# Patient Record
Sex: MALE | URBAN - METROPOLITAN AREA
[De-identification: names, ages, dates, MRNs, and addresses within clinical notes are randomized per-mention and may not be internally consistent; named-entity substitution may affect disease eponyms.]

---

## 2020-03-10 ENCOUNTER — LAB REQUISITION (OUTPATIENT)
Dept: LAB | Age: 35
End: 2020-03-10

## 2020-03-10 DIAGNOSIS — N52.9 MALE ERECTILE DYSFUNCTION, UNSPECIFIED: ICD-10-CM

## 2020-03-10 PROCEDURE — PSEU9837 TESTOSTERONE, FREE AND TOTAL MALE BY ED: Performed by: CLINICAL MEDICAL LABORATORY

## 2020-03-10 PROCEDURE — 84403 ASSAY OF TOTAL TESTOSTERONE: CPT | Performed by: CLINICAL MEDICAL LABORATORY

## 2020-03-10 PROCEDURE — 84402 ASSAY OF FREE TESTOSTERONE: CPT | Performed by: CLINICAL MEDICAL LABORATORY

## 2020-03-15 LAB
TESTOST FREE SERPL-MCNC: 118.4 PG/ML
TESTOST SERPL-MCNC: 428 NG/DL

## 2020-07-28 ENCOUNTER — LAB REQUISITION (OUTPATIENT)
Dept: LAB | Age: 35
End: 2020-07-28

## 2020-07-28 LAB
ALBUMIN SERPL-MCNC: 4 G/DL (ref 3.6–5.1)
ALP SERPL-CCNC: 73 UNITS/L (ref 45–117)
ALT SERPL-CCNC: 26 UNITS/L
AST SERPL-CCNC: 13 UNITS/L
BILIRUB CONJ SERPL-MCNC: 0.2 MG/DL (ref 0–0.2)
BILIRUB SERPL-MCNC: 1.1 MG/DL (ref 0.2–1)
BUN SERPL-MCNC: 21 MG/DL (ref 6–20)
CALCIUM SERPL-MCNC: 9.6 MG/DL (ref 8.4–10.2)
CHLORIDE SERPL-SCNC: 104 MMOL/L (ref 98–107)
CHOLEST SERPL-MCNC: 184 MG/DL
CREAT SERPL-MCNC: 0.96 MG/DL (ref 0.67–1.17)
FASTING DURATION TIME PATIENT: 12 HOURS
GFR SERPLBLD BASED ON 1.73 SQ M-ARVRAT: >90 ML/MIN/1.73M2
GGT SERPL-CCNC: 28 UNITS/L (ref 15–85)
GLUCOSE SERPL-MCNC: 71 MG/DL (ref 65–99)
HCV AB SER QL: NEGATIVE
PHOSPHATE SERPL-MCNC: 3.7 MG/DL (ref 2.4–4.7)
POTASSIUM SERPL-SCNC: 4.2 MMOL/L (ref 3.4–5.1)
PROT SERPL-MCNC: 7.1 G/DL (ref 6.4–8.2)
SODIUM SERPL-SCNC: 138 MMOL/L (ref 135–145)
TRIGL SERPL-MCNC: 120 MG/DL
URATE SERPL-MCNC: 6.7 MG/DL (ref 3.5–7.2)

## 2020-07-28 PROCEDURE — PSEU8240 CALCIUM: Performed by: CLINICAL MEDICAL LABORATORY

## 2020-07-28 PROCEDURE — 84100 ASSAY OF PHOSPHORUS: CPT | Performed by: CLINICAL MEDICAL LABORATORY

## 2020-07-28 PROCEDURE — 84295 ASSAY OF SERUM SODIUM: CPT | Performed by: CLINICAL MEDICAL LABORATORY

## 2020-07-28 PROCEDURE — 82977 ASSAY OF GGT: CPT | Performed by: CLINICAL MEDICAL LABORATORY

## 2020-07-28 PROCEDURE — 82435 ASSAY OF BLOOD CHLORIDE: CPT | Performed by: CLINICAL MEDICAL LABORATORY

## 2020-07-28 PROCEDURE — PSEU8294 SODIUM: Performed by: CLINICAL MEDICAL LABORATORY

## 2020-07-28 PROCEDURE — PSEU8253 CREATININE: Performed by: CLINICAL MEDICAL LABORATORY

## 2020-07-28 PROCEDURE — 84520 ASSAY OF UREA NITROGEN: CPT | Performed by: CLINICAL MEDICAL LABORATORY

## 2020-07-28 PROCEDURE — PSEU8528 HEPATITIS C ANTIBODY WITH REFLEX: Performed by: CLINICAL MEDICAL LABORATORY

## 2020-07-28 PROCEDURE — PSEU8303 URIC ACID: Performed by: CLINICAL MEDICAL LABORATORY

## 2020-07-28 PROCEDURE — 82565 ASSAY OF CREATININE: CPT | Performed by: CLINICAL MEDICAL LABORATORY

## 2020-07-28 PROCEDURE — 84550 ASSAY OF BLOOD/URIC ACID: CPT | Performed by: CLINICAL MEDICAL LABORATORY

## 2020-07-28 PROCEDURE — PSEU8246 CHLORIDE: Performed by: CLINICAL MEDICAL LABORATORY

## 2020-07-28 PROCEDURE — PSEU9047 TRIGLYCERIDE: Performed by: CLINICAL MEDICAL LABORATORY

## 2020-07-28 PROCEDURE — 84478 ASSAY OF TRIGLYCERIDES: CPT | Performed by: CLINICAL MEDICAL LABORATORY

## 2020-07-28 PROCEDURE — PSEU8282 POTASSIUM: Performed by: CLINICAL MEDICAL LABORATORY

## 2020-07-28 PROCEDURE — 84132 ASSAY OF SERUM POTASSIUM: CPT | Performed by: CLINICAL MEDICAL LABORATORY

## 2020-07-28 PROCEDURE — PSEU8267 HEPATIC FUNCTION PANEL: Performed by: CLINICAL MEDICAL LABORATORY

## 2020-07-28 PROCEDURE — PSEU8263 GGT: Performed by: CLINICAL MEDICAL LABORATORY

## 2020-07-28 PROCEDURE — 86803 HEPATITIS C AB TEST: CPT | Performed by: CLINICAL MEDICAL LABORATORY

## 2020-07-28 PROCEDURE — 80076 HEPATIC FUNCTION PANEL: CPT | Performed by: CLINICAL MEDICAL LABORATORY

## 2020-07-28 PROCEDURE — PSEU8279 PHOSPHORUS: Performed by: CLINICAL MEDICAL LABORATORY

## 2020-07-28 PROCEDURE — 82947 ASSAY GLUCOSE BLOOD QUANT: CPT | Performed by: CLINICAL MEDICAL LABORATORY

## 2020-07-28 PROCEDURE — 82465 ASSAY BLD/SERUM CHOLESTEROL: CPT | Performed by: CLINICAL MEDICAL LABORATORY

## 2020-07-28 PROCEDURE — PSEU8247 CHOLESTEROL: Performed by: CLINICAL MEDICAL LABORATORY

## 2020-07-28 PROCEDURE — PSEU8239 BLOOD UREA NITROGEN: Performed by: CLINICAL MEDICAL LABORATORY

## 2020-07-28 PROCEDURE — PSEU8103 GLUCOSE LEVEL: Performed by: CLINICAL MEDICAL LABORATORY

## 2020-07-28 PROCEDURE — 82310 ASSAY OF CALCIUM: CPT | Performed by: CLINICAL MEDICAL LABORATORY

## 2020-08-27 ENCOUNTER — LAB REQUISITION (OUTPATIENT)
Dept: LAB | Age: 35
End: 2020-08-27

## 2020-08-27 PROCEDURE — 84443 ASSAY THYROID STIM HORMONE: CPT | Performed by: CLINICAL MEDICAL LABORATORY

## 2020-08-27 PROCEDURE — 80061 LIPID PANEL: CPT | Performed by: CLINICAL MEDICAL LABORATORY

## 2020-08-27 PROCEDURE — PSEU8135 LIPID PANEL WITH REFLEX: Performed by: CLINICAL MEDICAL LABORATORY

## 2020-08-27 PROCEDURE — PSEU8168 THYROID STIMULATING HORMONE REFLEX: Performed by: CLINICAL MEDICAL LABORATORY

## 2020-08-28 LAB
CHOLEST SERPL-MCNC: 187 MG/DL
CHOLEST/HDLC SERPL: 4.9 {RATIO}
FASTING DURATION TIME PATIENT: 12 HOURS
HDLC SERPL-MCNC: 38 MG/DL
LDLC SERPL CALC-MCNC: 132 MG/DL
NONHDLC SERPL-MCNC: 149 MG/DL
TRIGL SERPL-MCNC: 87 MG/DL
TSH SERPL-ACNC: 2.93 MCUNITS/ML (ref 0.35–5)

## 2022-09-09 ENCOUNTER — APPOINTMENT (OUTPATIENT)
Dept: GENERAL RADIOLOGY | Age: 37
End: 2022-09-09
Attending: STUDENT IN AN ORGANIZED HEALTH CARE EDUCATION/TRAINING PROGRAM
Payer: COMMERCIAL

## 2022-09-09 ENCOUNTER — HOSPITAL ENCOUNTER (EMERGENCY)
Age: 37
Discharge: HOME | End: 2022-09-09
Attending: STUDENT IN AN ORGANIZED HEALTH CARE EDUCATION/TRAINING PROGRAM
Payer: COMMERCIAL

## 2022-09-09 VITALS
OXYGEN SATURATION: 98 % | WEIGHT: 205 LBS | HEIGHT: 68 IN | DIASTOLIC BLOOD PRESSURE: 93 MMHG | BODY MASS INDEX: 31.07 KG/M2 | HEART RATE: 70 BPM | TEMPERATURE: 97.9 F | SYSTOLIC BLOOD PRESSURE: 136 MMHG | RESPIRATION RATE: 16 BRPM

## 2022-09-09 DIAGNOSIS — S83.422A SPRAIN OF LATERAL COLLATERAL LIGAMENT OF LEFT KNEE, INITIAL ENCOUNTER: Primary | ICD-10-CM

## 2022-09-09 PROCEDURE — 73562 X-RAY EXAM OF KNEE 3: CPT

## 2022-09-09 PROCEDURE — 99283 EMERGENCY DEPT VISIT LOW MDM: CPT

## 2022-09-09 RX ORDER — PHENOL/SODIUM PHENOLATE
20 AEROSOL, SPRAY (ML) MUCOUS MEMBRANE DAILY
COMMUNITY

## 2022-09-09 NOTE — ED TRIAGE NOTES
Pt arrives to ER ambulatory in no acute distress with left knee pain that started yesterday while at work.

## 2022-09-09 NOTE — ED PROVIDER NOTES
Patient is a 49-year-old male presenting today with left knee pain. Yesterday while working, states that he was walking around in a freezer when he twisted his knee and has since had pain. Pain is along the lateral aspect of the knee. No swelling noted. Pain improves with NSAIDs. Worse with movement and walking. No history of other trauma to this knee. No other injuries. Right now pain is moderate in severity. Social History     Socioeconomic History    Marital status: SINGLE     Spouse name: Not on file    Number of children: Not on file    Years of education: Not on file    Highest education level: Not on file   Occupational History    Not on file   Tobacco Use    Smoking status: Never    Smokeless tobacco: Never   Substance and Sexual Activity    Alcohol use: Not Currently    Drug use: Never    Sexual activity: Not on file   Other Topics Concern    Not on file   Social History Narrative    Not on file     Social Determinants of Health     Financial Resource Strain: Not on file   Food Insecurity: Not on file   Transportation Needs: Not on file   Physical Activity: Not on file   Stress: Not on file   Social Connections: Not on file   Intimate Partner Violence: Not on file   Housing Stability: Not on file         ALLERGIES: Patient has no known allergies. Review of Systems   Musculoskeletal:  Positive for arthralgias. All other systems reviewed and are negative. Vitals:    09/09/22 1222   BP: (!) 136/93   Pulse: 70   Resp: 16   Temp: 97.9 °F (36.6 °C)   SpO2: 98%   Weight: 93 kg (205 lb)   Height: 5' 8\" (1.727 m)            Physical Exam  Constitutional:       General: He is not in acute distress. Appearance: He is well-developed. He is not ill-appearing. HENT:      Head: Normocephalic. Eyes:      Conjunctiva/sclera: Conjunctivae normal.   Pulmonary:      Effort: Pulmonary effort is normal. No respiratory distress. Musculoskeletal:         General: Normal range of motion. Cervical back: Normal range of motion. Comments: Left lower extremity: Tenderness to the lateral aspect of the knee and pain with varus strain on the knee. No ligamentous laxity. No effusion noted. Skin:     General: Skin is warm and dry. Capillary Refill: Capillary refill takes less than 2 seconds. Neurological:      Mental Status: He is alert and oriented to person, place, and time. Psychiatric:         Behavior: Behavior normal.        MDM         Procedures      Patient is a well-appearing 38-year-old male presenting today with lateral left knee pain. No ligamentous laxity. No effusion noted. X-ray negative. Suspect sprain of the knee. Treated with Ace wrap, neurovascular intact when checked by me after placement. Okay for discharge home with Ortho follow-up. ICD-10-CM ICD-9-CM    1.  Sprain of lateral collateral ligament of left knee, initial encounter  S83.422A 844.0         CHER Dobbs, DO

## 2022-09-09 NOTE — Clinical Note
[address]  [facility] EMERGENCY DEPT  [address] [id number]  [phone]    Work/School Note    Date: 9/9/2022    To Whom It May concern:      Jay Jay Allen was seen and treated today in the emergency room by the following provider(s):  Attending Provider: Jo-Ann Rosas is excused from work/school on 09/09/22. He is clear to return to work/school on 09/10/22.         Sincerely,          Igor Dobbs, DO